# Patient Record
Sex: MALE | Race: WHITE
[De-identification: names, ages, dates, MRNs, and addresses within clinical notes are randomized per-mention and may not be internally consistent; named-entity substitution may affect disease eponyms.]

---

## 2019-10-13 ENCOUNTER — HOSPITAL ENCOUNTER (EMERGENCY)
Dept: HOSPITAL 59 - ER | Age: 69
Discharge: HOME | End: 2019-10-13
Payer: MEDICARE
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
## 2019-10-13 DIAGNOSIS — M25.511: ICD-10-CM

## 2019-10-13 DIAGNOSIS — M62.830: ICD-10-CM
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
## 2019-10-13 DIAGNOSIS — G89.11: Primary | ICD-10-CM

## 2019-10-13 DIAGNOSIS — M54.2: ICD-10-CM

## 2019-10-13 LAB
ABSOLUTE NEUTROPHIL COUNT: 6.77
ANION GAP SERPL CALC-SCNC: 13 MMOL/L (ref 7–16)
BASOPHILS NFR BLD: 0.2 % (ref 0–6)
BUN SERPL-MCNC: 15 MG/DL (ref 8–23)
CO2 SERPL-SCNC: 26 MMOL/L (ref 22–29)
CREAT SERPL-MCNC: 1.1 MG/DL (ref 0.7–1.2)
EOSINOPHIL NFR BLD: 2.4 % (ref 0–6)
ERYTHROCYTE [DISTWIDTH] IN BLOOD BY AUTOMATED COUNT: 12.8 % (ref 11.5–14.5)
EST GLOMERULAR FILTRATION RATE: > 60 ML/MIN
GLUCOSE SERPL-MCNC: 102 MG/DL (ref 74–109)
GRANULOCYTES NFR BLD: 71.3 % (ref 47–80)
HCT VFR BLD CALC: 44.1 % (ref 42–52)
HGB BLD-MCNC: 14.9 GM/DL (ref 14–18)
LYMPHOCYTES NFR BLD AUTO: 18.9 % (ref 16–45)
MCH RBC QN AUTO: 29.7 PG (ref 27–33)
MCHC RBC AUTO-ENTMCNC: 33.8 G/DL (ref 32–36)
MCV RBC AUTO: 88 FL (ref 81–97)
MONOCYTES NFR BLD: 7.2 % (ref 0–9)
PLATELET # BLD: 204 K/UL (ref 130–400)
PMV BLD AUTO: 10.2 FL (ref 7.4–10.4)
RBC # BLD AUTO: 5.01 M/UL (ref 4.4–5.7)
WBC # BLD AUTO: 9.5 K/UL (ref 4.2–12.2)

## 2019-10-13 PROCEDURE — 80048 BASIC METABOLIC PNL TOTAL CA: CPT

## 2019-10-13 PROCEDURE — 99284 EMERGENCY DEPT VISIT MOD MDM: CPT

## 2019-10-13 PROCEDURE — 96374 THER/PROPH/DIAG INJ IV PUSH: CPT

## 2019-10-13 PROCEDURE — 71046 X-RAY EXAM CHEST 2 VIEWS: CPT

## 2019-10-13 PROCEDURE — 85025 COMPLETE CBC W/AUTO DIFF WBC: CPT

## 2019-10-13 PROCEDURE — 96375 TX/PRO/DX INJ NEW DRUG ADDON: CPT

## 2019-10-13 NOTE — EMERGENCY DEPARTMENT RECORD
History of Present Illness





- General


Chief Complaint: Back Pain/Injury


Stated Complaint: BACK PAIN/RT SHOULDER BLADE


Time Seen by Provider: 10/13/19 16:09


Mode of Arrival: Ambulatory


Limitations: No limitations





- History of Present Illness


Initial Comments: 


The patient is here due to back pain for 2 weeks. The onset was when he was 

fixing a faucet and was leaning under the sink and his back was stuck on the lip

of the cupboard under the sink. Since he has had pain in the R scapula area. The

pain is much worse with any bending or ROM of the R shoulder. The pain 

intermittently does radiate to the R shoulder but not down the arm. There is no 

arm weakness, numbness, or tingling. The patient also denies any CP, SOB, SHAN, 

fever, or cough.





MD Complaint: Back pain


Onset/Timin


-: Week(s)


Similar Symptoms Previously: No


Place: Home


Quality: Other


Consistency: Constant


Improves With: None


Worsens With: None


Context: Unknown


Associated Symptoms: Denies other symptoms


Treatments Prior to Arrival: NSAIDS


Treatment Prior to Arrival Comment:: 1400 today





- Related Data


                                  Previous Rx's











 Medication  Instructions  Recorded


 


Methylprednisolone [Medrol Dose 4 mg PO DAILY #1 tab.ds.pk 10/13/19





Pack]  


 


Naproxen [Naprosyn] 500 mg PO BID #14 tablet.dr 10/13/19











                                    Allergies











Allergy/AdvReac Type Severity Reaction Status Date / Time


 


No Known Drug Allergies Allergy   Verified 10/13/19 16:01














Travel Screening





- Travel/Exposure Within Last 30 Days


Have you traveled within the last 30 days?: No





- Travel/Exposure Within Last Year


Have you traveled outside the [place] in the last year?: No





- Additonal Travel Details


Have you been exposed to anyone with a communicable illness?: No





- Travel Symptoms


Symptom Screening: None





Review of Systems


Constitutional: Denies: Chills, Fever


Eyes: Denies: Eye discharge


ENT: Denies: Congestion


Respiratory: Denies: Cough, Dyspnea


Cardiovascular: Denies: Chest pain


Endocrine: Denies: Fatigue


Gastrointestinal: Denies: Nausea


Genitourinary: Denies: Dysuria


Musculoskeletal: Denies: Arthralgia





Past Medical History





- SOCIAL HISTORY


Smoking Status: Never smoker


Alcohol Use: Occasional


Drug Use: None





- RESPIRATORY


Hx Respiratory Disorders: No





- CARDIOVASCULAR


Hx Cardio Disorders: No





- NEURO


Hx Neuro Disorders: No





- GI


Hx GI Disorders: No





- 


Hx Genitourinary Disorders: No





- ENDOCRINE


Hx Endocrine Disorders: No





- MUSCULOSKELETAL


Hx Musculoskeletal Disorders: No





- PSYCH


Hx Psych Problems: No





- HEMATOLOGY/ONCOLOGY


Hx Hematology/Oncology Disorders: No





Family Medical History


Any Significant Family History?: No





Physical Exam





- General


General Appearance: Alert, Oriented x3, Cooperative, No acute distress





- Head


Head exam: Atraumatic, Normocephalic, Normal inspection





- Eye


Eye exam: Normal appearance, PERRL, EOMI





- ENT


Throat exam: Normal inspection.  negative: Tonsillar erythema, Tonsillar exudate





- Neck


Neck exam: Normal inspection, Full ROM.  negative: Tenderness





- Respiratory


Respiratory exam: Normal lung sounds bilaterally.  negative: Respiratory 

distress





- Cardiovascular


Cardiovascular Exam: Regular rate, Normal rhythm, Normal heart sounds





- GI/Abdominal


GI/Abdominal exam: Soft, Normal bowel sounds.  negative: Tenderness





- Extremities


Extremities exam: Normal inspection, Full ROM, Normal capillary refill.  

negative: Tenderness





- Back


Back exam: Reports: Normal inspection, Paraspinal tenderness (The pain is 100% 

reproducible to palpation over the area between the R scapula and the spine. 

There is no swelling, bruising, or erythema appreciated in the area.).  Denies: 

Vertebral tenderness


Image of Body Front/Back: 


                            __________________________














                            __________________________





 1 - Area of pain and tenderness.








- Neurological


Neurological exam: Alert, Normal gait, Oriented X3, Reflexes normal.  negative: 

Abnormal gait, Altered, Motor sensory deficit





Course





                                   Vital Signs











  10/13/19





  16:01


 


Temperature 97.2 F L


 


Pulse Rate 60


 


Respiratory 18





Rate 


 


Blood Pressure 145/76


 


Pulse Ox 97














- Reevaluation(s)


Reevaluation #1: 


The patient is doing better at this time and the pain is about 50% better. Now 

there is no pain over the R medial scapula area but only in the R deltoid area. 

There is no arm or leg numbness, weakness, or tingling. I did explain to the 

patient that I can concerned he may have a pinched nerve in his lower neck or 

upper thoracic area. He is to take the medicines as prescribed and is to see his

 PCP this week for further evaluation including a possible MRI.


10/13/19 18:17














Medical Decision Making





- Data Complexity


MDM Data: Labs Ordered and/or Reviewed, X-Ray Ordered and/or Reviewed





- Lab Data


Result diagrams: 


                                 10/13/19 16:30





                                 10/13/19 16:30





- Radiology Data


Radiology results: Report reviewed (CXR: Neg.)





Disposition


Disposition: Discharge


Clinical Impression: 


 Spasm of thoracic back muscle





Disposition: Home, Self-Care


Condition: (2) Stable


Instructions:  Muscle Spasm (ED)


Additional Instructions: 


Please take the Naprosyn and Medrol Dose Pack as directed. Please see your 

family doctor this week for re check and further testing. Return to the ER for 

any worsening pain, arm or leg numbness, weakness, or any bowel or bladder 

issues.


Prescriptions: 


Methylprednisolone [Medrol Dose Pack] 4 mg PO DAILY #1 tab.ds.pk


Naproxen [Naprosyn] 500 mg PO BID #14 tablet.dr


Forms:  Patient Portal Access


Time of Disposition: 18:22





Quality





- Quality Measures


Quality Measures: N/A





- Blood Pressure Screening


View Details: Yes


Does Patient Have Any of the Following: No


Blood Pressure Classification: Hypertensive Reading


Systolic Measurement: 145


Diastolic Measurement: 76


Screening for High Blood Pressure: < First Hypertensive BP, F/U Documented > 

[]


First Hypertensive Follow-up Interventions: Referral to alternative/primary care

 provider.

## 2019-10-14 NOTE — RADIOLOGY REPORT
EXAM:  CHEST, TWO VIEWS



HISTORY:  RIGHT SHOULDER BLADE AND SCAPULA PAIN SINCE WORKING IN AN ODD POSITION
UNDER A SINK TWO WEEKS AGO. 



TECHNIQUE:  PA and lateral views of the chest were obtained.  



Comparison:  None.  



FINDINGS:  The heart size is normal.  Mild torsion of the aorta.  No definite 
acute infiltrate seen.  No pleural effusion or pneumothorax evident.  The lungs 
do appear somewhat hyperinflated which may represent some underlying COPD.  Mild
spurring in the spine.  



IMPRESSION:  

1.  SOME HYPERINFLATION WHICH MAY REPRESENT SOME UNDERLYING COPD.  



2.  NO ACUTE INFILTRATE EVIDENT.  



3.  MILD SPURRING IN THE SPINE.  



JOB NUMBER:  [id number]

[facility]